# Patient Record
(demographics unavailable — no encounter records)

---

## 2024-11-15 NOTE — DATA REVIEWED
[FreeTextEntry1] : 10/4/24 ([facility]) B/l sMMG/US- heterogeneously dense. R 1.2cm lobulated mass 11:00 12FN, rec US bx. BI-RADS 4  11/4/24 ([facility]/???) L US bx 11:00 (cork clip)- breast tissue with focal fibroadenomatous change, focal pseudoangiomatous stromal hyperplasia and rare microcalcifications. These findings are concordant with imaging. Rec 6m f/u US.

## 2024-11-15 NOTE — ASSESSMENT
[FreeTextEntry1] : Patient is a 44yo F here for initial evaluation of abnormal breast imaging. On screening imaging 10/4/24, noted to have a right breast 1.2 x 0.6 cm lobulated hypoechoic mass 11:00 12FN which underwent US bx; benign and concordant results.   YAN __ %. Patient without complaints. Physical exam WNL. Most recent imaging reviewed. Plan for targeted R US and re-examination in May 2025??. Patient verbalizes understanding and is in agreement with the plan.

## 2024-11-15 NOTE — HISTORY OF PRESENT ILLNESS
[FreeTextEntry1] : Patient is a 44yo F, referred by Dr. Guaman, here for initial evaluation of abnormal breast imaging. On screening imaging 10/4/24, noted to have a right breast 1.2 x 0.6 cm lobulated hypoechoic mass 11:00 12FN. This underwent ultrasound biopsy 11/4/24, yielded benign and concordant results and recommended for six month follow up ultrasound.  Patient denies palpable masses, nipple discharge, skin changes.??   Denies prior breast surgeries or biopsies.?? Patient denies family history of breast cancer?? Denies famhx of ovarian cancer.?? Patient has not had genetic testing performed??   Carole Lifetime Risk %??